# Patient Record
Sex: MALE | URBAN - METROPOLITAN AREA
[De-identification: names, ages, dates, MRNs, and addresses within clinical notes are randomized per-mention and may not be internally consistent; named-entity substitution may affect disease eponyms.]

---

## 2021-01-03 ENCOUNTER — OFFICE VISIT (OUTPATIENT)
Dept: FAMILY MEDICINE CLINIC | Facility: CLINIC | Age: 76
End: 2021-01-03

## 2021-01-03 VITALS
WEIGHT: 200 LBS | SYSTOLIC BLOOD PRESSURE: 132 MMHG | DIASTOLIC BLOOD PRESSURE: 68 MMHG | BODY MASS INDEX: 29.62 KG/M2 | HEART RATE: 100 BPM | RESPIRATION RATE: 16 BRPM | OXYGEN SATURATION: 98 % | TEMPERATURE: 98 F | HEIGHT: 69 IN

## 2021-01-03 DIAGNOSIS — L08.9 LOCAL SKIN INFECTION: ICD-10-CM

## 2021-01-03 DIAGNOSIS — R21 RASH: Primary | ICD-10-CM

## 2021-01-03 PROCEDURE — 3008F BODY MASS INDEX DOCD: CPT | Performed by: NURSE PRACTITIONER

## 2021-01-03 PROCEDURE — 3075F SYST BP GE 130 - 139MM HG: CPT | Performed by: NURSE PRACTITIONER

## 2021-01-03 PROCEDURE — 99202 OFFICE O/P NEW SF 15 MIN: CPT | Performed by: NURSE PRACTITIONER

## 2021-01-03 PROCEDURE — 3078F DIAST BP <80 MM HG: CPT | Performed by: NURSE PRACTITIONER

## 2021-01-03 RX ORDER — MIRABEGRON 25 MG/1
25 TABLET, FILM COATED, EXTENDED RELEASE ORAL DAILY
COMMUNITY
Start: 2020-11-26

## 2021-01-03 NOTE — PROGRESS NOTES
CHIEF COMPLAINT:   Patient presents with:  Rash: x 1 month         HPI:    Willa Maldonado is a 76year old male who presents for evaluation of a rash. Per patient rash started in the past 1 month. Rash has been coming and going since onset.   Patient has not Temp 98.2 °F (36.8 °C) (Skin)   Resp 16   Ht 5' 9\" (1.753 m)   Wt 200 lb (90.7 kg)   SpO2 98%   BMI 29.53 kg/m²   GENERAL: well developed, well nourished,in no apparent distress  SKIN: Rash/lesion(s): abdomen with 7 scarred over circular areas size of a area.   • triamcinolone acetonide 0.1 % External Cream 30 g 0     Sig: Apply topically 2 (two) times daily. Apply to affected area sparingly 2 times daily.              Patient Instructions   Wash skin with antibacterial dial soap  Avoid touching or scratch

## 2021-01-03 NOTE — PATIENT INSTRUCTIONS
Wash skin with antibacterial dial soap  Avoid touching or scratching    May use triamcinolone cream to irritated bumps  Use mupirocin cream to local redden areas    Follow-up with PCP if not improving